# Patient Record
Sex: FEMALE | Race: WHITE
[De-identification: names, ages, dates, MRNs, and addresses within clinical notes are randomized per-mention and may not be internally consistent; named-entity substitution may affect disease eponyms.]

---

## 2017-11-28 ENCOUNTER — HOSPITAL ENCOUNTER (OUTPATIENT)
Dept: HOSPITAL 80 - FIMAGING | Age: 32
End: 2017-11-28
Attending: ADVANCED PRACTICE MIDWIFE
Payer: COMMERCIAL

## 2017-11-28 DIAGNOSIS — Z34.02: Primary | ICD-10-CM

## 2017-11-28 DIAGNOSIS — Z3A.19: ICD-10-CM

## 2018-04-16 ENCOUNTER — HOSPITAL ENCOUNTER (INPATIENT)
Dept: HOSPITAL 80 - FLD | Age: 33
LOS: 2 days | Discharge: HOME | End: 2018-04-18
Attending: OBSTETRICS & GYNECOLOGY | Admitting: OBSTETRICS & GYNECOLOGY
Payer: COMMERCIAL

## 2018-04-16 DIAGNOSIS — Z3A.39: ICD-10-CM

## 2018-04-16 LAB — PLATELET # BLD: 170 10^3/UL (ref 150–400)

## 2018-04-16 PROCEDURE — 0KQM0ZZ REPAIR PERINEUM MUSCLE, OPEN APPROACH: ICD-10-PCS | Performed by: ADVANCED PRACTICE MIDWIFE

## 2018-04-16 RX ADMIN — IBUPROFEN PRN MG: 600 TABLET ORAL at 17:57

## 2018-04-16 NOTE — GHP
[f rep st]



                                                       PREOP HISTORY AND PHYSICAL





DATE OF ADMISSION:  04/16/2018



HISTORY:  The patient is a 32-year-old, G1, P0, at 38 weeks and 6 days with sure last menstrual perio
d and 9-week ultrasound, who presents with spontaneous onset of labor.  Her contractions began approx
imately 5:30 and have gradually increased.  The patient is having increased fatigue and wanted to pre
sent and find out how the labor progress is going.  Bag of water is intact.  The patient has not had 
any bleeding except a small amount with a mucus plug.  Good fetal movement.



CURRENT PREGNANCY HISTORY:  The patient has been followed with [facility] since 9 weeks' ge
station.  The patient has essentially had an uncomplicated pregnancy course.



PRENATAL LABS:  Include maternal blood type A positive with negative antibody screen.  Initial hemato
crit 42% with drop down to 39% in the pregnancy.  RPR nonreactive.  Rubella immune.  Hepatitis B surf
ace antigen negative.  HIV negative.  1-hour Glucola is normal.  Urinalysis was negative.  Gonorrhea 
and chlamydia were negative.  TSH 2.2 with free T4 normal.  Negative screening for SMA and cystic fib
rosis.  GBS culture was negative.



PAST MEDICAL HISTORY:  History of migraines, abnormal Pap smears with positive HPV but negative for t
ype 16 and 18P.  The patient had colposcopies in 2014 and 2015.



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
 in her infancy.  She has not had any subsequent problems.



ALLERGIES:  No known drug allergies.



CURRENT MEDICATIONS:  Only prenatal vitamins.



SOCIAL HISTORY:  The patient is , lives with her , and her mother is prior Labor and De
livery nurse.  The patient is a nonsmoker.  No alcohol or drug use.



PHYSICAL EXAM:  GENERAL:  Upon admission, the patient is a well-developed, well-nourished white femal
e in no acute distress.  VITAL SIGNS:  The patient is afebrile with normal vital signs.  See nursing 
documentation for full details.  Fetal heart tones reveal a category 1 tracing, baseline in the 130s,
 with good variability and accelerations.  Contractions every 3-5 minutes apart.  Palpably mild to mo
derate.  PELVIC:  Revealed the cervix was a loose 4 cm, 95% effaced, -1 station.  Bag of water is int
act.  EXTREMITIES:  Nontender with no edema.



ASSESSMENT:  Intrauterine pregnancy at 38 weeks and 6 days with spontaneous onset of labor.  Bag of w
ater intact.  GBS negative.  The patient wants to attempt a natural approach to labor.  We will get a
n epidural if needed.



PLAN:  Expected vaginal delivery.  History of positive HPV.  Will follow up Paps postpartum.





Job #:  [id number]

## 2018-04-16 NOTE — OBDEL
Birth Info


Presentation at Delivery: Vertex


L&D Analgesia/Anesthesia Type: Epidural


GBS+: No


Intrapartum Medications: 





 











Generic Name Dose Route Start Last Admin





  Trade Name Freq  PRN Reason Stop Dose Admin


 


Calcium Carbonate  500 mg  18 10:51  18 11:23





  Tums  PO  10/13/18 10:50  500 mg





  TID PRN   Administration





  Indigestion   


 


Oxytocin/Sodium Chloride  500 mls @ 0 mls/hr  18 11:30  18 14:02





  Pitocin 30 Units/Ns (Premix)  IV  10/13/18 11:29  500 mls





  CONT OPAL   Administration





  Protocol   





  Per Protocol   














- Infant Care Provider


Pediatrician/NNP: Lisseth Barr





- Hospital Course


Intrapartum: 





18 14:21


Comfortable with epidural, on side with peanut ball in place


Indications for Delivery: Spontaneous Labor, SROM





Vaginal Delivery





- Delivery Provider


Delivery Physician/CNM: Lorena Moss


Proctoring Provider: Tino Chaudhry





- Labor and Delivery


Onset of Contractions Date: 18


Onset of Contractions Time: 17:30


Onset of Contractions Type: Augmented


Rupture of Membranes Date: 18


Rupture of Membranes Time: 07:52


Rupture of Membranes Type: Spontaneous


Amniotic Fluid Color: Clear


Dilation Complete Date: 18


Dilation Complete Time: 12:25


Placenta Delivery Date: 18


Placenta Delivery Time: 16:41


Total Hours of Labor: 0


Non-surgical Procedures: FSE


Laceration: 2nd Degree, Other (Specify) (left sulcus)


Repair: 2-0, Vicryl


Vaginal Sponge Count Correct: Yes


Vaginal Needle Count Correct: Yes


Vaginal Sweep Performed: No


EBL: 300


Cord Gases: 





 Cord Gases











Cord Blood PCO2  65.0 mmHg (37-60)  H  18  16:40    


 


Cord Base Excess  -9.8 mEq/L (-13.6--3.2)   18  16:40    


 


Cord ABG pH  7.14  (7.10-7.37)   18  16:40    


 


Cord VBG pH  7.26  (7.20-7.42)   18  16:40    














 Operative Report





- Delivery


Cord Gases: 





 Cord Gases











Cord Blood PCO2  65.0 mmHg (37-60)  H  18  16:40    


 


Cord Base Excess  -9.8 mEq/L (-13.6--3.2)   18  16:40    


 


Cord ABG pH  7.14  (7.10-7.37)   18  16:40    


 


Cord VBG pH  7.26  (7.20-7.42)   18  16:40    














Assissted Delivery


Assisted Delivery Type: Vacuum


Station: +3


Pop offs (Total): 1


Pulls (Total): 2


Assisted Delivery Comment: 





vacuum assisted delivery by LAVERN Camara





 Birth Data


KYLAH: 18


Gestational Age: 38 week(s) and 6 day(s)


  ** Rangel


Delivery Date: 18


Delivery Time: 16:36


Sex of Infant: Female


Apgar Score (1 Min): 7


Apgar Score (5 Min): 9





ICD10 Worksheet


Patient Problems: 


 Problems











Problem Status Onset


 


EARLY LABOR Acute

## 2018-04-16 NOTE — OBPROG
Labor Progress Note


Assessment/Plan: 


Assessment:


 at 38.6 weeks ega with spontaneous labor


Comfortable with epidural in place


Completely dilated, 0 station and laboring down


Cntx q 5 min 























Plan:


Will continue to labor down


Augment with pitocin to improve contraction frequency


Anticipate 





18 14:18





18 14:27





Subjective/Intrapartum Course: 





18 14:21


Comfortable with epidural, on side with peanut ball in place


Objective: 





 





 18 04:50 





 











Patient ABO/Rh  A POSITIVE   18  04:50    








VSS


Category 1 EFM


Winters: Cntx mod to palpation q 5 min


CX:complete/0 station





- SVE


Dilation (cm): 10


Effacement (%): 100


Station: 0


Membranes: SROM


Amniotic Fluid Color: Clear


Dilation Complete Date: 18





- Contraction Pattern Assessment


Current Contraction Pattern: Regular





- FHR Assessment


  ** Rangel


FHR (bpm): 140


FHR Pattern Variability: Moderate


FHR Category: 1





Oxytocin Orders Assessment





- Pre-Induction/Augmentation Assessment


Gestational Age: 38 week(s) and 6 day(s)





ICD10 Worksheet


Patient Problems: 


 Problems











Problem Status Onset


 


EARLY LABOR Acute

## 2018-04-16 NOTE — PREANESOB
Obstetric Pre-Anesthesia Info





- General Info


Proposed Procedure: Labor epidural


: 1


Para: 0


KYLAH: 18


Gestational Age: 38 week(s) and 6 day(s)





Anesthesia


ROS: 


wnl


Allergies/Adverse Reactions: 


 











Allergy/AdvReac Type Severity Reaction Status Date / Time


 


No Known Allergies Allergy   Unverified 18 04:06











Home Medications: 














 Medication  Instructions  Recorded


 


Prenatal 1 tab PO DAILY 18











Visit Medications: 





 











Generic Name Dose Route Start Last Admin





  Trade Name Freq  PRN Reason Stop Dose Admin


 


Ammonia (Aromatic Spirit)  1 each  18 04:31  





  Ammonia Aromatic  IH  18 04:30  





  ONCE PRN   





  Fainting   


 


Lactated Ringer's  1,000 mls @ 0 mls/hr  18 04:31  





  Lr  IV  18 04:30  





  PRN PRN   





  SEE PROTOCOL CONDITIONS   





  Protocol   





  Per Protocol   


 


Oxytocin/Sodium Chloride  1,000 mls @ 0 mls/hr  18 04:31  





  Pitocin 20 Units/Ns (Premix)  IV   





  PRN PRN   





  Post-partum bleeding   





  Per Protocol   


 


Fentanyl 200 mcg/ Bupivacaine  100 mls @ 0 mls/hr  18 09:00  





  HCl 20 ml/ Sodium Chloride  EP  18 08:59  





  CONT OPAL   





  Protocol   





  As Directed   


 


Ibuprofen  600 mg  18 04:31  





  Motrin  PO  10/13/18 04:30  





  Q6HRS PRN   





  post partum, inflammation   


 


Lidocaine HCl  300 mg  18 04:31  





  Lidocaine Hcl 1%  SC  10/13/18 04:30  





  ONCE PRN   





  episiotomy   


 


Magnesium Sulfate  454 gm  18 04:31  





  Epsom Salt  TP  10/13/18 04:30  





  Q1H PRN   





  perineal discomfort    


 


Misoprostol  800 - 1,000 mcg  18 04:31  





  Cytotec  PO  10/13/18 04:30  





  ONCE PRN   





  Vaginal Atony/Bleeding   


 


Olive Oil  118 ml  18 04:31  





  Sweet Oil  MISC  10/13/18 04:30  





  ONCE PRN   





  perineal massage   


 


Terbutaline Sulfate  0.25 mg  18 04:31  





  Brethine  IV  10/13/18 04:30  





  ONCE PRN   





  Tachysystole   














Discontinued Medications














Generic Name Dose Route Start Last Admin





  Trade Name Freq  PRN Reason Stop Dose Admin


 


Ammonia (Aromatic Spirit)  Confirm  18 05:38  





  Ammonia Aromatic  Administered  18 05:39  





  Dose   





  1 each   





  IH   





  .STK-MED ONE   


 


Lidocaine HCl  Confirm  18 05:38  





  Lidocaine Hcl 1%  Administered  18 05:39  





  Dose   





  300 mg   





  .ROUTE   





  .STK-MED ONE   


 


Misoprostol  Confirm  18 05:39  





  Cytotec  Administered  18 05:40  





  Dose   





  1,000 mcg   





  .ROUTE   





  .STK-MED ONE   


 


Olive Oil  Confirm  18 05:38  





  Sweet Oil  Administered  18 05:39  





  Dose   





  118 ml   





  .ROUTE   





  .STK-MED ONE   


 


Oxytocin  Confirm  18 05:39  





  Pitocin  Administered  18 05:40  





  Dose   





  40 unit   





  .ROUTE   





  .STK-MED ONE   


 


Phenylephrine HCl  Confirm  18 08:44  





  Neosynephrine  Administered  18 08:45  





  Dose   





  1,000 mcg   





  .ROUTE   





  .STK-MED ONE   


 


Terbutaline Sulfate  Confirm  18 05:39  





  Brethine  Administered  18 05:40  





  Dose   





  1 mg   





  .ROUTE   





  .STK-MED ONE   














- Vital Signs


Height/Weight (Nursing): 





 











Height                         172.72 cm


 


Weight                         96.162 kg

















- Focused Exam


Neck exam: FROM


Mallampati Score: Class 2


Mouth exam: normal dental/mouth exam


Pulmonary: no respiratory distress


Cardiovascular: regular rate and rhythym


Labs: 





 





 18 04:50 





 











Patient ABO/Rh  A POSITIVE   18  04:50    














- Plan


Anesthetic Plan: labor epidural


Consent Signed and on Chart: Yes


Patient/Guardian Understands and Agrees to Plan: Yes


Urgent/Emergent Case: Aniceto hanson completed preop but documented later for safe 

timely pt care

## 2018-04-17 RX ADMIN — IBUPROFEN PRN MG: 600 TABLET ORAL at 00:05

## 2018-04-17 RX ADMIN — DOCUSATE SODIUM PRN MG: 100 CAPSULE, LIQUID FILLED ORAL at 10:35

## 2018-04-17 RX ADMIN — DOCUSATE SODIUM PRN MG: 100 CAPSULE, LIQUID FILLED ORAL at 01:17

## 2018-04-17 RX ADMIN — IBUPROFEN PRN MG: 600 TABLET ORAL at 12:10

## 2018-04-17 RX ADMIN — IBUPROFEN PRN MG: 600 TABLET ORAL at 18:01

## 2018-04-17 RX ADMIN — IRON SUPPLEMENT SCH MG: 325 TABLET ORAL at 10:35

## 2018-04-17 RX ADMIN — IBUPROFEN PRN MG: 600 TABLET ORAL at 06:00

## 2018-04-17 NOTE — OBDEL
Birth Info


Birth Type: Vaginal


Presentation at Delivery: Vertex


L&D Analgesia/Anesthesia Type: Epidural


GBS+: No


Intrapartum Medications: 





 











Generic Name Dose Route Start Last Admin





  Trade Name Freq  PRN Reason Stop Dose Admin


 


Calcium Carbonate  500 mg  18 10:51  18 11:23





  Tums  PO  10/13/18 10:50  500 mg





  TID PRN   Administration





  Indigestion   


 


Ibuprofen  600 mg  18 04:31  18 17:57





  Motrin  PO  10/13/18 04:30  600 mg





  Q6HRS PRN   Administration





  post partum, inflammation   














Discontinued Medications














Generic Name Dose Route Start Last Admin





  Trade Name Freq  PRN Reason Stop Dose Admin


 


Oxytocin/Sodium Chloride  500 mls @ 0 mls/hr  18 11:30  18 14:02





  Pitocin 30 Units/Ns (Premix)  IV  10/13/18 11:29  500 mls





  CONT OPAL   Administration





  Protocol   





  Per Protocol   











Indications for Delivery: Spontaneous Labor, SROM





Vaginal Delivery





- Delivery Provider


Delivery Physician/CNM: Tino Chaudhry





- Labor and Delivery


Onset of Contractions Date: 18


Onset of Contractions Time: 17:30


Onset of Contractions Type: Augmented


Rupture of Membranes Date: 18


Rupture of Membranes Time: 07:52


Rupture of Membranes Type: Spontaneous


Amniotic Fluid Color: Clear


Dilation Complete Date: 18


Dilation Complete Time: 12:25


Placenta Delivery Date: 18


Placenta Delivery Time: 16:41


Total Hours of Labor: 0


Non-surgical Procedures: FSE


Laceration: 2nd Degree (With left sulcus/sidewall)


Repair: 3-0 (Laceration repaired by Lorena Moss CNM.)


Vaginal Sponge Count Correct: Yes


Vaginal Needle Count Correct: Yes


Delivery Events: None


Delivery Comment: 


I was the OBGYN on call backing up Lorena Moss CNM for this delivery.  

She had started to push and had been pushing for approximately 30 minutes when 

I was called that FHR had dropped to the 70s and had remained down.  I 

presented to the bedside as mom had oxygen on, left lateral decub position, and 

FHR tracing was just coming back up to 120-130 bpm, very reassuring with 

accelerations.  I did place an FSE as there was some difficulty obtaining FHR 

data, and also examined her and found her to be in direct OA position at +3/5 

station.  We had a discussion about options for delivery, from continued 

pushing to pushing in the OR, to the potential for my assistance with operative 

vaginal delivery.  RBA discussed for VAVD and pt verbally agreed and consented.





Bladder had been emptied just 30 minutes prior per nursing report.  Between 

contractions I placed the hand-held firm cup Kiwi vaccumm on the point of 

flexion with ease, care taken to not trap any maternal tissue.  Then with three 

contractions and one pop-off we were able to bring the baby underneath the 

pubic bone and delivered easily.  Pressure would brought down between 

contractions, progress made in terms of lower station with each pull and 

contraction.  Suction cup removed at the perineum and the LEFT anterior 

shoulder delivered easily followed by the posterior shoulder and body.  Baby 

placed up on mom's chest and we did wait for 60 seconds of delayed cord 

clamping before clamping cord.  Cord gasses sent, placenta not sent to 

pathology.  Placenta delivered spontaneously without issue, grossly normal upon 

initial inspection.  At this point I did hand off care of the patient and 

repair of the laceration specifically to Lorena Moss CNM so that I could 

deal with another emergent situation with another patient.  EBL approximately 

250cc when I left the room. 


Cord Gases: 





 Cord Gases











Cord Blood PCO2  65.0 mmHg (37-60)  H  18  16:40    


 


Cord Base Excess  -9.8 mEq/L (-13.6--3.2)   18  16:40    


 


Cord ABG pH  7.14  (7.10-7.37)   18  16:40    


 


Cord VBG pH  7.26  (7.20-7.42)   18  16:40    














- Medications


Labor Augmentation/Induction Methods Used: Pitocin


Labor Augmentation/Induction Indication: Contraction Strength Inadequate





 Operative Report





- Delivery


Cord Gases: 





 Cord Gases











Cord Blood PCO2  65.0 mmHg (37-60)  H  18  16:40    


 


Cord Base Excess  -9.8 mEq/L (-13.6--3.2)   18  16:40    


 


Cord ABG pH  7.14  (7.10-7.37)   18  16:40    


 


Cord VBG pH  7.26  (7.20-7.42)   04/16/18  16:40    














Assissted Delivery


Assisted Delivery Type: Vacuum (Firm cup KIWI)


Station: +3


Pop offs (Total): 1


Pulls (Total): 7


Assisted Delivery Comment: 





See prior note for VAVD description.





 Birth Data


KYLAH: 18


Gestational Age: 39 week(s) and 0 day(s)


  ** Rangel


Delivery Date: 18


Delivery Time: 16:36


Sex of Infant: Female


[place] Weight (gm): 3264 kg


Apgar Score (1 Min): 7


Apgar Score (5 Min): 9





Shoulder Dystocia


Dystocia Comment: 





No shoulder dystocia





ICD10 Worksheet


Patient Problems: 


 Problems











Problem Status Onset


 


EARLY LABOR Acute  


 


Non-reassuring fetal cardiotocographic tracing Acute  


 


Vacuum extraction, delivered, current hospitalization Acute  














- ICD10 Problem Qualifiers


(1) Non-reassuring fetal cardiotocographic tracing








(2) Vacuum extraction, delivered, current hospitalization

## 2018-04-17 NOTE — OBPP
PostPartum Progress Note


Assessment/Plan: 


Assessment:


  s/p vacuum assisted  @38.6 weeks ega


PPD #1 


Baby in NICU but stable - currently pumping, unable to breastfeed yet


























Plan:


Routine PP care 


Anticipate discharge tomorrow 








18 14:18





18 14:27





18 11:12





18 11:19





18 11:31





Subjective/Postpartum Course: 





18 11:27


Patient is doing well this morning.  Reports lochia to be light, pain 

controlled with oral medication, tolerating regular diet and voiding.  Baby in 

in NICU doing well- is skin to skin at this time.  She started pumping this 

morning - is hoping to start breastfeeding this afternoon.    


Objective: 





 





 18 06:10 





 











Patient ABO/Rh  A POSITIVE   18  04:50    








 











Temp Pulse Resp BP Pulse Ox


 


 36.8 C   70   17   103/65   96 


 


 18 08:00  18 08:00  18 08:00  18 08:00  18 08:00











VSS


Respirations unlabored


Extremities with minimal edema


Lochia scant


Fundus firm


Perineum healing well - well approximated


Nipples intact








Uterine Position/Fundal Height: At Umbilicus


Uterine Tone: Firm





PostPartum Physical Exam





- Physical Exam


EENT: PERRL/EOMI


Respiratory: normal breath sounds


Cardiac/Chest: regular rate, rhythm


Extremities: normal range of motion


Skin: normal color, warm/dry


Neuro/Psych: alert, normal mood/affect, oriented x 3

## 2018-04-18 VITALS — DIASTOLIC BLOOD PRESSURE: 75 MMHG | SYSTOLIC BLOOD PRESSURE: 103 MMHG

## 2018-04-18 RX ADMIN — DOCUSATE SODIUM PRN MG: 100 CAPSULE, LIQUID FILLED ORAL at 09:44

## 2018-04-18 RX ADMIN — IBUPROFEN PRN MG: 600 TABLET ORAL at 16:38

## 2018-04-18 RX ADMIN — IBUPROFEN PRN MG: 600 TABLET ORAL at 09:43

## 2018-04-18 RX ADMIN — IRON SUPPLEMENT SCH MG: 325 TABLET ORAL at 09:44

## 2018-04-18 RX ADMIN — IBUPROFEN PRN MG: 600 TABLET ORAL at 00:50

## 2018-04-18 NOTE — OBPP
PostPartum Progress Note


Assessment/Plan: 


Assessment:








1) s/p VAVD PPD #2 - pt is stable





2)  Anemia - pt is asymptomatic














Plan:





Plan for d/c home today, pt will board since baby girl is in NICU


Instructions reviewed with pt


No Rx given


Cont PNV, iron, colace and Motrin


Pelvic rest


RTC in 4 and 6 weeks for pp visit








04/18/18 10:15





Subjective/Postpartum Course: 





04/17/18 11:27


Patient is doing well this morning.  Reports lochia to be light, pain 

controlled with oral medication, tolerating regular diet and voiding.  Baby in 

in NICU doing well- is skin to skin at this time.  She started pumping this 

morning - is hoping to start breastfeeding this afternoon.    





04/18/18 10:17


Pt seen and examined. Doing well, she is breastfeeding baby girl in the NICU. 

Minimal cramping, controlled with Motrin. Moderate lochia. Pt is OOB, sondra reg 

diet, voiding and BM x 1. BF is going well. She is going to board.


Objective: 





 





 04/17/18 06:10 





 











Patient ABO/Rh  A POSITIVE   04/16/18  04:50    








 











Temp Pulse Resp BP Pulse Ox


 


 36.1 C   64   16   103/75   96 


 


 04/18/18 08:40  04/18/18 08:40  04/18/18 08:40  04/18/18 08:40  04/18/18 08:40











Uterine Position/Fundal Height: Umbilicus -2


Uterine Tone: Firm





PostPartum Physical Exam





- Physical Exam


Respiratory: lungs clear, normal breath sounds


Cardiac/Chest: regular rate, rhythm


Abdomen: normal bowel sounds, non-tender, soft, flatus (+)


Extremities: non-tender, normal inspection


Skin: normal color, warm/dry


Neuro/Psych: alert, normal mood/affect, oriented x 3

## 2018-04-24 ENCOUNTER — HOSPITAL ENCOUNTER (OUTPATIENT)
Dept: HOSPITAL 80 - FLACT | Age: 33
End: 2018-04-24
Attending: OBSTETRICS & GYNECOLOGY
Payer: COMMERCIAL

## 2018-04-24 DIAGNOSIS — O92.70: Primary | ICD-10-CM

## 2018-04-24 PROCEDURE — G0463 HOSPITAL OUTPT CLINIC VISIT: HCPCS

## 2023-06-05 NOTE — OBGCSDC
General Delivery Information





- General Info


: 1


Para: 1


Abortions: 0


Birth Type: Vaginal


L&D Analgesia/Anesthesia Type: Epidural


Admission Date: 18


Labs: 





 











Patient ABO/Rh  A POSITIVE   18  04:50    


 


Hct  34.6 % (38.0-47.0)  L  18  06:10    














- Hospital Course


Postpartum: 





18 11:27


Patient is doing well this morning.  Reports lochia to be light, pain 

controlled with oral medication, tolerating regular diet and voiding.  Baby in 

in NICU doing well- is skin to skin at this time.  She started pumping this 

morning - is hoping to start breastfeeding this afternoon.    





18 10:17


Pt seen and examined. Doing well, she is breastfeeding baby girl in the NICU. 

Minimal cramping, controlled with Motrin. Moderate lochia. Pt is OOB, sondra reg 

diet, voiding and BM x 1. BF is going well. She is going to board.





Vaginal Birth





- Delivery Provider


Delivery Physician/CNM: Tino Chaudhry





- Diagnosis


Labor: Augmented


Rupture of Membranes Type: Spontaneous


Amniotic Fluid Color: Clear


Laceration: 2nd Degree (With left sulcus/sidewall)


Repair: 3-0 (Laceration repaired by Lorena Moss CNM.)


Delivery Events: None





- Procedures


Assisted Delivery Type: Vacuum (Firm cup KIWI)


Non-surgical Procedures: FSE





 Birth





-  Delivery


Non-surgical Procedures: FSE





[place] Birth Data


KYLAH: 18


Gestational Age: 39 week(s) and 1 day(s)


  ** Rangel


Delivery Date: 18


Delivery Time: 16:36


Sex of Infant: Female


[place] Weight (gm): 3264 kg


Apgar Score (1 Min): 7


Apgar Score (5 Min): 9





Discharge Information





- Discharge Information


Condition: Good


Instruction/Follow Up: Four Weeks, Six Weeks Detail Level: Detailed